# Patient Record
Sex: FEMALE | Race: WHITE | NOT HISPANIC OR LATINO | URBAN - METROPOLITAN AREA
[De-identification: names, ages, dates, MRNs, and addresses within clinical notes are randomized per-mention and may not be internally consistent; named-entity substitution may affect disease eponyms.]

---

## 2021-03-31 DIAGNOSIS — Z23 ENCOUNTER FOR IMMUNIZATION: ICD-10-CM

## 2022-10-21 ENCOUNTER — OFFICE VISIT (OUTPATIENT)
Dept: URGENT CARE | Facility: CLINIC | Age: 57
End: 2022-10-21
Payer: COMMERCIAL

## 2022-10-21 VITALS
BODY MASS INDEX: 23.7 KG/M2 | WEIGHT: 151 LBS | HEIGHT: 67 IN | DIASTOLIC BLOOD PRESSURE: 89 MMHG | TEMPERATURE: 99.9 F | OXYGEN SATURATION: 98 % | SYSTOLIC BLOOD PRESSURE: 132 MMHG | HEART RATE: 81 BPM | RESPIRATION RATE: 16 BRPM

## 2022-10-21 DIAGNOSIS — J06.9 VIRAL URI: Primary | ICD-10-CM

## 2022-10-21 PROCEDURE — G0382 LEV 3 HOSP TYPE B ED VISIT: HCPCS | Performed by: FAMILY MEDICINE

## 2022-10-21 RX ORDER — ALBUTEROL SULFATE 90 UG/1
2 AEROSOL, METERED RESPIRATORY (INHALATION) EVERY 4 HOURS PRN
COMMUNITY
Start: 2022-05-10

## 2022-10-21 NOTE — PROGRESS NOTES
[facility]        NAME: Danielle Resendez is a 59 y o  female  : 1965    MRN: [id number]  DATE: 2022  TIME: 2:49 PM    Assessment and Plan   Viral URI [J06 9]  1  Viral URI           Patient Instructions     Decongestants given for congestion  No signs of bacterial infection today  Follow up with PCP in 3-5 days if no improvement  Proceed to ER if symptoms worsen  Chief Complaint     Chief Complaint   Patient presents with   • Sore Throat     Pt  States that her throat is scratchy, loss of voice, and wheezing  History of Present Illness     Danielle Resendez is a 59 y o  female presenting to the office today for upper respiratory complaints  Symptoms have been present for 2 days, and include scratchy throat, congestion, cough, and mild shortness of breath  She has tried her rescue inhaler for her symptoms, with mild relief  Review of Systems     Review of Systems   Constitutional: Negative for chills, fatigue and fever  HENT: Positive for congestion, postnasal drip and sore throat  Negative for ear discharge, ear pain, sinus pressure and sinus pain  Eyes: Negative for pain and discharge  Respiratory: Positive for cough and shortness of breath  Cardiovascular: Negative for chest pain and palpitations  Gastrointestinal: Negative for abdominal pain, diarrhea, nausea and vomiting  Genitourinary: Negative for difficulty urinating and dysuria  Musculoskeletal: Negative for arthralgias and myalgias  Skin: Negative for rash  Neurological: Negative for dizziness, syncope, light-headedness, numbness and headaches  Psychiatric/Behavioral: Negative for agitation  All other systems reviewed and are negative        Current Medications       Current Outpatient Medications:   •  albuterol (PROVENTIL HFA,VENTOLIN HFA) 90 mcg/act inhaler, Inhale 2 puffs every 4 (four) hours as needed, Disp: , Rfl:   •  Calcium Carbonate-Vit D-Min (CALCIUM 1200 PO), Take 1 tablet by mouth daily, Disp: , Rfl:   •  Cholecalciferol 25 MCG (1000 UT) CHEW, Chew daily, Disp: , Rfl:     Current Allergies     Allergies as of 10/21/2022   • (No Known Allergies)            The following portions of the patient's history were reviewed and updated as appropriate: allergies, current medications, past family history, past medical history, past social history, past surgical history and problem list      No past medical history on file  No past surgical history on file  No family history on file  Medications have been verified  Objective     /89   Pulse 81   Temp 99 9 °F (37 7 °C)   Resp 16   Ht 5' 7" (1 702 m)   Wt 68 5 kg (151 lb)   SpO2 98%   BMI 23 65 kg/m²   No LMP recorded  Physical Exam     Physical Exam  Vitals reviewed  Constitutional:       General: She is not in acute distress  Appearance: Normal appearance  She is not ill-appearing  HENT:      Head: Normocephalic and atraumatic  Right Ear: Ear canal normal  A middle ear effusion is present  Left Ear: Ear canal normal  A middle ear effusion is present  Nose:      Right Sinus: No maxillary sinus tenderness or frontal sinus tenderness  Left Sinus: No maxillary sinus tenderness or frontal sinus tenderness  Mouth/Throat:      Mouth: Mucous membranes are moist       Pharynx: Posterior oropharyngeal erythema present  No oropharyngeal exudate  Tonsils: No tonsillar exudate  Eyes:      Extraocular Movements: Extraocular movements intact  Conjunctiva/sclera: Conjunctivae normal       Pupils: Pupils are equal, round, and reactive to light  Cardiovascular:      Rate and Rhythm: Normal rate and regular rhythm  Pulses: Normal pulses  Heart sounds: Normal heart sounds  No murmur heard  Pulmonary:      Effort: Pulmonary effort is normal  No respiratory distress  Breath sounds: Normal breath sounds  No wheezing     Musculoskeletal:      Cervical back: Normal range of motion and neck supple  No tenderness  Skin:     General: Skin is warm  Neurological:      General: No focal deficit present  Mental Status: She is alert     Psychiatric:         Mood and Affect: Mood normal          Behavior: Behavior normal          Judgment: Judgment normal

## 2023-08-01 ENCOUNTER — VBI (OUTPATIENT)
Dept: ADMINISTRATIVE | Facility: OTHER | Age: 58
End: 2023-08-01

## 2023-08-15 ENCOUNTER — OFFICE VISIT (OUTPATIENT)
Dept: NEUROLOGY | Facility: CLINIC | Age: 58
End: 2023-08-15
Payer: COMMERCIAL

## 2023-08-15 VITALS
OXYGEN SATURATION: 98 % | DIASTOLIC BLOOD PRESSURE: 66 MMHG | HEIGHT: 67 IN | BODY MASS INDEX: 22.91 KG/M2 | TEMPERATURE: 97.8 F | SYSTOLIC BLOOD PRESSURE: 110 MMHG | RESPIRATION RATE: 18 BRPM | WEIGHT: 146 LBS | HEART RATE: 84 BPM

## 2023-08-15 DIAGNOSIS — R20.0 RIGHT LEG NUMBNESS: ICD-10-CM

## 2023-08-15 DIAGNOSIS — M62.81 PROXIMAL MUSCLE WEAKNESS: Primary | ICD-10-CM

## 2023-08-15 PROCEDURE — 99204 OFFICE O/P NEW MOD 45 MIN: CPT | Performed by: PSYCHIATRY & NEUROLOGY

## 2023-08-15 RX ORDER — BETAMETHASONE DIPROPIONATE 0.5 MG/G
CREAM TOPICAL
COMMUNITY
Start: 2023-06-13

## 2023-08-15 NOTE — PROGRESS NOTES
Patient ID: Mc Hess is a 59 y.o. female. Assessment/Plan:    Right leg numbness  The patient's main complaint is right hip flexor instability with right leg numbness, particularly with prolonged sitting. However, on exam, she also has some proximal muscle weakness. Given the paresthesias, there is probably a radicular component. Recommendation:  -Check EMG RLE  -She will continue to pursue chiropractic treatment as it has been helpful    Proximal muscle weakness  She also has proximal muscle weakness in the upper extremity. She has deltoid and supraspinatus weakness. A more global myopathic disorder could have the same distribution. She denies speech, swallow or respiratory difficulty other than very mild asthma. Recommendation:  -Check EMG 1 upper and 1 lower extremity to evaluate for myopathic changes  -Advised to try some upper extremity strengthening such as Pilates or yoga or weight training        Follow-up after EMG 1 upper and 1 lower extremity or sooner if difficulties    Total time 50 minutes, including face-to-face time with the patient and time before and after this visit reviewing records, on the same day of this visit. Subjective: This is a 25-year-old right handed lady here for new patient neuromuscular consultation for numbness, specifically involving the right leg. She has a past medical history of well controlled asthma. If she sits on the couch for a while, her R foot and calf will get numb. Her R foot will lock up. She has had R hip flexor dysfunction for about a year. No buttock pain. Can occur while in bed. She sleeps on her R side. 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 She had laser, US and electric stim. The other night, on walking up the stairs, she could not feel either foot. She was able to walk upstairs. The feeling returned within minutes. No back pain. She has never had an EMG.     No speech, swallowing or respiratory difficulty. Objective:    Blood pressure 110/66, pulse 84, temperature 97.8 °F (36.6 °C), temperature source Temporal, resp. rate 18, height 5' 7" (1.702 m), weight 66.2 kg (146 lb), SpO2 98 %. Physical Exam  Constitutional:       Appearance: Normal appearance. HENT:      Mouth/Throat:      Mouth: Mucous membranes are moist.      Pharynx: Oropharynx is clear. Eyes:      Conjunctiva/sclera: Conjunctivae normal.   Neck:      Vascular: No carotid bruit. Cardiovascular:      Rate and Rhythm: Normal rate and regular rhythm. Heart sounds: Normal heart sounds. Pulmonary:      Effort: Pulmonary effort is normal.      Breath sounds: Normal breath sounds. No wheezing. Skin:     General: Skin is warm and dry. Psychiatric:         Mood and Affect: Mood normal.         Neurological Exam  Mental status: Awake, alert, oriented to person, place and time. Recall, naming, knowledge, repetition, concentration and naming intact. Cranial nerves: Extraocular movements intact. Pupils equally round and reactive to light. Visual fields full to confrontation. Facial sensation intact. Face symmetric. Hearing intact. Tongue, uvula, palate midline and intact. Sternocleidomastoid intact. Motor:   Deltoid: Right 4 /5, left 4 /5  Supraspinatus: Right 4+/5, left 4+/5  Biceps: Right 5-/5, left 5-/5  Triceps: Right 5/5, left 5/5  : Right 5/5, left 5/5      Iliopsoas: Right 4 /5, left 4+ /5  Quadriceps: Right 5/5, left 5/5  Gluteus medius, right 5/5, left 5/5  Hip adductors: Right 5/5, left 5/5  Tibialis anterior: Right 5/5, left 5/5  Medial gastrocnemius: Right 5/5, left 5/5  Extensor hallucis longus: Right 4+ /5, left 4+ /5  Abductor hallucis: Right 5/5, left 5/5    Normal tone. Atrophy: None    Cerebellar: No dysmetria. Heel-to-shin intact.   Gait normal.    Reflexes:   Biceps: Right 2+, left 2+  Triceps: Right 2+, left 2+  Brachioradialis: Right 2+, left 2+  Patellar: Right 2+, left 2+  Achilles: Right 2+, left 2+  Plantar responses flexor bilaterally. Sensory: Light touch intact. Vibration 12 seconds on the right and 8 seconds at the left great toe. Temperature reduced in the left leg. Romberg negative. ROS:    Review of Systems   Constitutional: Negative for appetite change, fatigue and fever. HENT: Negative. Negative for hearing loss, tinnitus, trouble swallowing and voice change. Eyes: Negative. Negative for photophobia, pain and visual disturbance. Respiratory: Negative. Negative for shortness of breath. Cardiovascular: Negative. Negative for palpitations. Gastrointestinal: Negative. Negative for nausea and vomiting. Endocrine: Negative. Negative for cold intolerance. Genitourinary: Negative. Negative for dysuria, frequency and urgency. Musculoskeletal: Negative for back pain, gait problem, myalgias and neck pain. Skin: Negative. Negative for rash. Allergic/Immunologic: Negative. Neurological: Positive for numbness (Mostly on right leg and toes, sometimes on the left leg and toes). Negative for dizziness, tremors, seizures, syncope, facial asymmetry, speech difficulty, weakness, light-headedness and headaches. Hematological: Negative. Does not bruise/bleed easily. Psychiatric/Behavioral: Negative. Negative for confusion, hallucinations and sleep disturbance. All other systems reviewed and are negative.     Data:  12/6/2022:  BUN 13  Creatinine 0.67  AST 12  ALT 23  Cholesterol 253  LDL calculated 154  Vitamin D 27  HbA1c 5.7

## 2023-08-15 NOTE — ASSESSMENT & PLAN NOTE
The patient's main complaint is right hip flexor instability with right leg numbness, particularly with prolonged sitting. However, on exam, she also has some proximal muscle weakness. Given the paresthesias, there is probably a radicular component.     Recommendation:  -Check EMG RLE  -She will continue to pursue chiropractic treatment as it has been helpful

## 2023-08-15 NOTE — ASSESSMENT & PLAN NOTE
She also has proximal muscle weakness in the upper extremity. She has deltoid and supraspinatus weakness. A more global myopathic disorder could have the same distribution. She denies speech, swallow or respiratory difficulty other than very mild asthma.     Recommendation:  -Check EMG 1 upper and 1 lower extremity to evaluate for myopathic changes  -Advised to try some upper extremity strengthening such as Pilates or yoga or weight training

## 2023-10-04 ENCOUNTER — NEW PATIENT COMPREHENSIVE (OUTPATIENT)
Dept: URBAN - METROPOLITAN AREA CLINIC 6 | Facility: CLINIC | Age: 58
End: 2023-10-04

## 2023-10-04 DIAGNOSIS — H40.033: ICD-10-CM

## 2023-10-04 DIAGNOSIS — H25.813: ICD-10-CM

## 2023-10-04 PROCEDURE — 99204 OFFICE O/P NEW MOD 45 MIN: CPT

## 2023-10-04 PROCEDURE — 92020 GONIOSCOPY: CPT

## 2023-10-04 ASSESSMENT — VISUAL ACUITY
OD_CC: 20/40-2
OS_PH: 20/40-1
OS_CC: 20/80
OD_PH: 20/30-1

## 2023-10-04 ASSESSMENT — TONOMETRY
OS_IOP_MMHG: 18
OD_IOP_MMHG: 19

## 2023-12-13 ENCOUNTER — PROCEDURE ONLY (OUTPATIENT)
Dept: URBAN - METROPOLITAN AREA CLINIC 6 | Facility: CLINIC | Age: 58
End: 2023-12-13

## 2023-12-13 DIAGNOSIS — H40.033: ICD-10-CM

## 2023-12-13 PROCEDURE — 66761 REVISION OF IRIS: CPT

## 2023-12-13 ASSESSMENT — VISUAL ACUITY
OS_PH: 20/40
OD_PH: 20/30
OD_CC: 20/40
OS_CC: 20/80

## 2023-12-13 ASSESSMENT — TONOMETRY
OS_IOP_MMHG: 15
OD_IOP_MMHG: 10

## 2023-12-14 ENCOUNTER — 1 DAY POST-OP (OUTPATIENT)
Dept: URBAN - METROPOLITAN AREA CLINIC 6 | Facility: CLINIC | Age: 58
End: 2023-12-14

## 2023-12-14 DIAGNOSIS — Z98.890: ICD-10-CM

## 2023-12-14 PROCEDURE — 99024 POSTOP FOLLOW-UP VISIT: CPT

## 2023-12-14 ASSESSMENT — TONOMETRY
OD_IOP_MMHG: 11
OS_IOP_MMHG: 15

## 2023-12-14 ASSESSMENT — VISUAL ACUITY
OD_CC: 20/40-1
OS_CC: 20/40

## 2024-01-03 ENCOUNTER — PROCEDURE ONLY (OUTPATIENT)
Dept: URBAN - METROPOLITAN AREA CLINIC 6 | Facility: CLINIC | Age: 59
End: 2024-01-03

## 2024-01-03 DIAGNOSIS — H40.032: ICD-10-CM

## 2024-01-03 PROCEDURE — 66761 REVISION OF IRIS: CPT

## 2024-01-03 ASSESSMENT — TONOMETRY
OS_IOP_MMHG: 10
OD_IOP_MMHG: 11

## 2024-01-03 ASSESSMENT — VISUAL ACUITY: OS_SC: 20/30

## 2024-01-04 ENCOUNTER — POST-OP CHECK (OUTPATIENT)
Dept: URBAN - METROPOLITAN AREA CLINIC 6 | Facility: CLINIC | Age: 59
End: 2024-01-04

## 2024-01-04 DIAGNOSIS — H40.033: ICD-10-CM

## 2024-01-04 PROCEDURE — 99024 POSTOP FOLLOW-UP VISIT: CPT

## 2024-01-04 ASSESSMENT — TONOMETRY
OS_IOP_MMHG: 12
OD_IOP_MMHG: 15

## 2024-01-04 ASSESSMENT — VISUAL ACUITY
OS_CC: 20/40
OD_CC: 20/40

## 2025-08-13 ENCOUNTER — TELEPHONE (OUTPATIENT)
Age: 60
End: 2025-08-13

## (undated) RX ORDER — PREDNISOLONE ACETATE 10 MG/ML
1 SUSPENSION/ DROPS OPHTHALMIC
Start: 2023-12-13

## (undated) RX ORDER — PREDNISOLONE ACETATE 10 MG/ML: 1 SUSPENSION/ DROPS OPHTHALMIC